# Patient Record
Sex: FEMALE | Race: WHITE | Employment: FULL TIME | URBAN - METROPOLITAN AREA
[De-identification: names, ages, dates, MRNs, and addresses within clinical notes are randomized per-mention and may not be internally consistent; named-entity substitution may affect disease eponyms.]

---

## 2018-10-15 ENCOUNTER — HOSPITAL ENCOUNTER (OUTPATIENT)
Facility: CLINIC | Age: 33
Discharge: HOME | End: 2018-10-15
Attending: FAMILY MEDICINE | Admitting: FAMILY MEDICINE
Payer: COMMERCIAL

## 2018-10-15 VITALS
SYSTOLIC BLOOD PRESSURE: 120 MMHG | RESPIRATION RATE: 16 BRPM | HEART RATE: 90 BPM | DIASTOLIC BLOOD PRESSURE: 73 MMHG | TEMPERATURE: 98 F

## 2018-10-15 PROBLEM — Z34.80 PRENATAL CARE, SUBSEQUENT PREGNANCY: Status: ACTIVE | Noted: 2018-10-15

## 2018-10-15 LAB
ALBUMIN UR-MCNC: NEGATIVE MG/DL
AMORPH CRY #/AREA URNS HPF: ABNORMAL /HPF
APPEARANCE UR: ABNORMAL
BILIRUB UR QL STRIP: NEGATIVE
COLOR UR AUTO: YELLOW
GLUCOSE UR STRIP-MCNC: NEGATIVE MG/DL
HGB UR QL STRIP: NEGATIVE
KETONES UR STRIP-MCNC: NEGATIVE MG/DL
LEUKOCYTE ESTERASE UR QL STRIP: NEGATIVE
NITRATE UR QL: NEGATIVE
PH UR STRIP: 6 PH (ref 5–7)
RBC #/AREA URNS AUTO: <1 /HPF (ref 0–2)
RUPTURE OF FETAL MEMBRANES BY ROM PLUS: NEGATIVE
SOURCE: ABNORMAL
SP GR UR STRIP: 1.01 (ref 1–1.03)
UROBILINOGEN UR STRIP-MCNC: 0 MG/DL (ref 0–2)
WBC #/AREA URNS AUTO: 2 /HPF (ref 0–5)

## 2018-10-15 PROCEDURE — G0463 HOSPITAL OUTPT CLINIC VISIT: HCPCS | Mod: 25

## 2018-10-15 PROCEDURE — 59025 FETAL NON-STRESS TEST: CPT

## 2018-10-15 PROCEDURE — 84112 EVAL AMNIOTIC FLUID PROTEIN: CPT | Performed by: OBSTETRICS & GYNECOLOGY

## 2018-10-15 PROCEDURE — 81001 URINALYSIS AUTO W/SCOPE: CPT | Performed by: OBSTETRICS & GYNECOLOGY

## 2018-10-15 RX ORDER — ONDANSETRON 2 MG/ML
4 INJECTION INTRAMUSCULAR; INTRAVENOUS EVERY 6 HOURS PRN
Status: DISCONTINUED | OUTPATIENT
Start: 2018-10-15 | End: 2018-10-15 | Stop reason: HOSPADM

## 2018-10-15 NOTE — IP AVS SNAPSHOT
[facility]    [address] [address]    Phone:  [phone]    Fax:  [phone]                                       After Visit Summary   10/15/2018    Katharine Barger    MRN: [id number]           After Visit Summary Signature Page     I have received my discharge instructions, and my questions have been answered. I have discussed any challenges I see with this plan with the nurse or doctor.    ..........................................................................................................................................  Patient/Patient Representative Signature      ..........................................................................................................................................  Patient Representative Print Name and Relationship to Patient    ..................................................               ................................................  Date                                   Time    ..........................................................................................................................................  Reviewed by Signature/Title    ...................................................              ..............................................  Date                                               Time          22EPIC Rev 08/18

## 2018-10-15 NOTE — PROGRESS NOTES
S: Discharge from triage  A: Vital Signs  Temp: 98  F (36.7  C)  Temp src: Oral  Resp: 16  Pulse: 90  BP: 120/73     FHR  Monitor: External US  Variability: Moderate  Baseline Rate (Fetus A): 140 bpm  Baseline Classification: Normal  Accelerations: Present  Decelerations: None  NST Start Time: 1410  NST Stop Time: 1430  Non-stress Test Interpretation: Reactive  FHTs are category 1. Strip reviewed by Sylvia Greene RN.  Uterine Activity  Monitor: [place]  Contraction Frequency (minutes): irregular  Contraction Duration (seconds): 40-60  Contraction Quality: Not feeling contraction  Resting Tone Palpated: Soft     Admission on 10/15/2018   Component Date Value     Color Urine 10/15/2018 Yellow      Appearance Urine 10/15/2018 Slightly Cloudy      Glucose Urine 10/15/2018 Negative      Bilirubin Urine 10/15/2018 Negative      Ketones Urine 10/15/2018 Negative      Specific Gravity Urine 10/15/2018 1.012      Blood Urine 10/15/2018 Negative      pH Urine 10/15/2018 6.0      Protein Albumin Urine 10/15/2018 Negative      Urobilinogen mg/dL 10/15/2018 0.0      Nitrite Urine 10/15/2018 Negative      Leukocyte Esterase Urine 10/15/2018 Negative      Source 10/15/2018 Midstream Urine      WBC Urine 10/15/2018 2      RBC Urine 10/15/2018 <1      Amorphous Crystals 10/15/2018 Few*     Rupture of Fetal Membran* 10/15/2018 Negative      Dr. ALEX Barry informed of above and discharge order received.   R: Plan includes: Labor instuctions and warning signs given Fetal kick count instructions given. Follow up clinic appointment: keep scheduled appt.  Patient instructed to report any recurrence of above concerns to her primary care provider during clinic hours or [facility] at any other time. Patient verbalized understanding of education and agreement with plan. Agrees to call for any problems, questions or concerns.  Discharged undelivered via ambulatory  in stable condition with all belongings. Accompanied by  .

## 2018-10-15 NOTE — IP AVS SNAPSHOT
MRN:[id number]                      After Visit Summary   10/15/2018    Katharine Barger    MRN: [id number]           Thank you!     Thank you for choosing [facility] for your care. Our goal is always to provide you with excellent care. Hearing back from our patients is one way we can continue to improve our services. Please take a few minutes to complete the written survey that you may receive in the mail after you visit with us. Thank you!        Patient Information     Date Of Birth          1985        Designated Caregiver       Most Recent Value    Caregiver    Will someone help with your care after discharge? yes    Name of designated caregiver Bassam    Phone number of caregiver [phone]      About your hospital stay     You were admitted on:  October 15, 2018 You last received care in the:  [facility]    You were discharged on:  October 15, 2018       Who to Call     For medical emergencies, please call 911.  For non-urgent questions about your medical care, please call your primary care provider or clinic, [phone]          Attending Provider     Provider Specialty    Jennifer Barry MD Family Practice       Primary Care Provider Office Phone # Fax #    Jennifer Barry [phone]      Further instructions from your care team       If you have any questions or concerns after returning home, call your regular doctor or [facility] at [phone].    You were seen for: Rule out rupture of membreanes  You had the following care or tests: ROM+, UA, NST      Important things to know after discharge:    Drink 8-10 glasses of juice or water each day.    May take bath or shower.    Rest on your side--left side is best.    Eat lightly--soups, Jell-O, etc.  No spicy foods.    Activity:--walking is good during early labor but rest when you are tired.    CALL YOUR DOCTOR IF:    Your bag of poole (membranes) break or you notice leaking in your  "underwear.     Bright red blood leaking from your vagina..    For second (plus) baby: contractions (tightenings) less than 10 minutes apart and getting stronger.    Other: Call [facility] with questions or concerns    Follow up with your DOCTOR:  As scheduled    I have read these instructions and I understand the above information.         Pending Results     No orders found from 10/13/2018 to 10/16/2018.            Admission Information     Date & Time Provider Department Dept. Phone    10/15/2018 Jennifer Barry MD [facility] [phone]      Your Vitals Were     Blood Pressure Pulse Temperature Respirations          120/73 90 98  F (36.7  C) (Oral) 16        MyChart Information     [employer] lets you send messages to your doctor, view your test results, renew your prescriptions, schedule appointments and more. To sign up, go to [email or web address] . Click on \"Log in\" on the left side of the screen, which will take you to the Welcome page. Then click on \"Sign up Now\" on the right side of the page.     You will be asked to enter the access code listed below, as well as some personal information. Please follow the directions to create your username and password.     Your access code is: [email or web address]  Expires: 2019  3:30 PM     Your access code will  in 90 days. If you need help or a new code, please call your [place] clinic or [phone].        Care EveryWhere ID     This is your Care EveryWhere ID. This could be used by other organizations to access your [place] medical records  [id number]        Equal Access to Services     [facility] DOLORES : Hadii zaira randhawao Soderek, waaxda luqadaha, qaybta kaalmada tika wiggins. So [facility] [phone].    ATENCIÓN: Si habla español, tiene a tao disposición servicios gratuitos de asistencia lingüística. Llame al [phone].    We comply with applicable federal civil rights laws and [place] laws. We " do not discriminate on the basis of race, color, national origin, age, disability, sex, sexual orientation, or gender identity.               Review of your medicines      UNREVIEWED medicines. Ask your doctor about these medicines        Dose / Directions    PRENATAL VITAMINS PO        Dose:  1 tablet   Take 1 tablet by mouth daily   Refills:  0                Protect others around you: Learn how to safely use, store and throw away your medicines at www.disposemymeds.org.             Medication List: This is a list of all your medications and when to take them. Check marks below indicate your daily home schedule. Keep this list as a reference.      Medications           Morning Afternoon Evening Bedtime As Needed    PRENATAL VITAMINS PO   Take 1 tablet by mouth daily                                          More Information        Kick Counts    It s normal to worry about your baby s health. One way you can know your baby s doing well is to record the baby s movements once a day. This is called a kick count. Remember to take your kick count records to all your appointments with your healthcare provider.  How to count kicks  Here are tips for counting kicks:    Choose a time when the baby is active, such as after a meal.     Sit comfortably or lie on your side.     The first time the baby moves, write down the time.     Count each movement until the baby has moved 10 times. This can take from 20 minutes to 2 hours.     Try to do it at the same time each day.  When to call your healthcare provider  Call your healthcare provider right away if you notice any of the following:    Your baby moves fewer than 10 times in 2 hours while you re doing kick counts.    Your baby moves much less often than on the days before.    You have not felt your baby move all day.  Date Last Reviewed: 12/1/2017 2000-2017 [employer]. [address] [place] [address], [place] [address]. All rights reserved. This information is not  intended as a substitute for professional medical care. Always follow your healthcare professional's instructions.

## 2018-10-15 NOTE — PROGRESS NOTES
S:Patient presents due to  fluid leaking .  B:38w5d   Allergies: Review of patient's allergies indicates no known allergies.  A:Vital Signs  Temp: 98  F (36.7  C)  Temp src: Oral  Resp: 16  Pulse: 90  BP: 120/73     FHR  Monitor: External US  Variability: Moderate  Baseline Rate (Fetus A): 140 bpm  Baseline Classification: Normal  Accelerations: Present  Decelerations: None  NST Start Time: 1410  NST Stop Time: 1430  Non-stress Test Interpretation: Reactive  FHTs are category 1.  Uterine Activity  Monitor: [place]  Contraction Frequency (minutes): irregular  Contraction Duration (seconds): 40-60  Contraction Quality: Not feeling contraction  Resting Tone Palpated: Soft       Dr. ALEX Barry called and orders received.  Plan includes; Encourage po fluids, Monitor, observe and reevaluate and Labs: ROM+ and UA . Reviewed with patient and she agrees with plan.   Bill of Rights given & questions discussed?: Yes    Prenatal Breastfeeding Education Toolkit provided for patient to review,helping her to make an informed decision on a feeding choice for her baby. Patient declined. Questions answered.    Oriented to room and call light.

## 2018-10-15 NOTE — DISCHARGE INSTRUCTIONS
If you have any questions or concerns after returning home, call your regular doctor or the [facility] at [phone].    You were seen for: Rule out rupture of membreanes  You had the following care or tests: ROM+, UA, NST      Important things to know after discharge:    Drink 8-10 glasses of juice or water each day.    May take bath or shower.    Rest on your side--left side is best.    Eat lightly--soups, Jell-O, etc.  No spicy foods.    Activity:--walking is good during early labor but rest when you are tired.    CALL YOUR DOCTOR IF:    Your bag of poole (membranes) break or you notice leaking in your underwear.     Bright red blood leaking from your vagina..    For second (plus) baby: contractions (tightenings) less than 10 minutes apart and getting stronger.    Other: Call [facility]place with questions or concerns    Follow up with your DOCTOR:  As scheduled    I have read these instructions and I understand the above information.

## 2018-10-17 ENCOUNTER — HEALTH MAINTENANCE LETTER (OUTPATIENT)
Age: 33
End: 2018-10-17

## 2018-10-21 ENCOUNTER — TELEPHONE (OUTPATIENT)
Dept: OBGYN | Facility: CLINIC | Age: 33
End: 2018-10-21

## 2018-10-21 NOTE — PATIENT INSTRUCTIONS
Encouraged taking ES tylenol every 6 hours and to push fluids.  Call back if it is unrelieved and she wants to be checked out.  Pt is ok with this.

## 2018-10-21 NOTE — TELEPHONE ENCOUNTER
"Pt called into the [facility] with reports of headache that she's had all day \"but it won't go away\".  She is due on Wednesday.  Denies history of HTN in this pregnancy.  No swelling of hands, feet or face.  Did see stars a couple times.  Took a nap and has pushed fluids so her pee is almost clear.    "

## 2018-11-01 ENCOUNTER — HOSPITAL ENCOUNTER (INPATIENT)
Facility: CLINIC | Age: 33
LOS: 1 days | Discharge: HOME | End: 2018-11-02
Attending: FAMILY MEDICINE | Admitting: OBSTETRICS & GYNECOLOGY
Payer: COMMERCIAL

## 2018-11-01 LAB — BLOOD BANK CMNT PATIENT-IMP: NORMAL

## 2018-11-01 PROCEDURE — 25000132 ZZH RX MED GY IP 250 OP 250 PS 637: Performed by: FAMILY MEDICINE

## 2018-11-01 PROCEDURE — 12000027 ZZH R&B OB

## 2018-11-01 PROCEDURE — 25000132 ZZH RX MED GY IP 250 OP 250 PS 637: Performed by: OBSTETRICS & GYNECOLOGY

## 2018-11-01 PROCEDURE — 72200001 ZZH LABOR CARE VAGINAL DELIVERY SINGLE

## 2018-11-01 RX ORDER — AMOXICILLIN 250 MG
2 CAPSULE ORAL 2 TIMES DAILY
Status: DISCONTINUED | OUTPATIENT
Start: 2018-11-01 | End: 2018-11-01

## 2018-11-01 RX ORDER — LANOLIN 100 %
OINTMENT (GRAM) TOPICAL
Status: DISCONTINUED | OUTPATIENT
Start: 2018-11-01 | End: 2018-11-01

## 2018-11-01 RX ORDER — HYDROCORTISONE 2.5 %
CREAM (GRAM) TOPICAL 3 TIMES DAILY PRN
Status: DISCONTINUED | OUTPATIENT
Start: 2018-11-01 | End: 2018-11-02 | Stop reason: HOSPADM

## 2018-11-01 RX ORDER — OXYTOCIN/0.9 % SODIUM CHLORIDE 30/500 ML
340 PLASTIC BAG, INJECTION (ML) INTRAVENOUS CONTINUOUS PRN
Status: DISCONTINUED | OUTPATIENT
Start: 2018-11-01 | End: 2018-11-02 | Stop reason: HOSPADM

## 2018-11-01 RX ORDER — NALOXONE HYDROCHLORIDE 0.4 MG/ML
.1-.4 INJECTION, SOLUTION INTRAMUSCULAR; INTRAVENOUS; SUBCUTANEOUS
Status: DISCONTINUED | OUTPATIENT
Start: 2018-11-01 | End: 2018-11-02 | Stop reason: HOSPADM

## 2018-11-01 RX ORDER — BISACODYL 10 MG
10 SUPPOSITORY, RECTAL RECTAL DAILY PRN
Status: DISCONTINUED | OUTPATIENT
Start: 2018-11-03 | End: 2018-11-02 | Stop reason: HOSPADM

## 2018-11-01 RX ORDER — BISACODYL 10 MG
10 SUPPOSITORY, RECTAL RECTAL DAILY PRN
Status: DISCONTINUED | OUTPATIENT
Start: 2018-11-03 | End: 2018-11-01

## 2018-11-01 RX ORDER — LANOLIN 100 %
OINTMENT (GRAM) TOPICAL
Status: DISCONTINUED | OUTPATIENT
Start: 2018-11-01 | End: 2018-11-02 | Stop reason: HOSPADM

## 2018-11-01 RX ORDER — OXYTOCIN/0.9 % SODIUM CHLORIDE 30/500 ML
100 PLASTIC BAG, INJECTION (ML) INTRAVENOUS CONTINUOUS
Status: DISCONTINUED | OUTPATIENT
Start: 2018-11-01 | End: 2018-11-01

## 2018-11-01 RX ORDER — HYDROCORTISONE 2.5 %
CREAM (GRAM) TOPICAL 3 TIMES DAILY PRN
Status: DISCONTINUED | OUTPATIENT
Start: 2018-11-01 | End: 2018-11-01

## 2018-11-01 RX ORDER — OXYTOCIN 10 [USP'U]/ML
10 INJECTION, SOLUTION INTRAMUSCULAR; INTRAVENOUS
Status: DISCONTINUED | OUTPATIENT
Start: 2018-11-01 | End: 2018-11-02 | Stop reason: HOSPADM

## 2018-11-01 RX ORDER — OXYTOCIN/0.9 % SODIUM CHLORIDE 30/500 ML
100 PLASTIC BAG, INJECTION (ML) INTRAVENOUS CONTINUOUS
Status: DISCONTINUED | OUTPATIENT
Start: 2018-11-01 | End: 2018-11-02 | Stop reason: HOSPADM

## 2018-11-01 RX ORDER — AMOXICILLIN 250 MG
1 CAPSULE ORAL 2 TIMES DAILY
Status: DISCONTINUED | OUTPATIENT
Start: 2018-11-01 | End: 2018-11-02 | Stop reason: HOSPADM

## 2018-11-01 RX ORDER — IBUPROFEN 800 MG/1
800 TABLET, FILM COATED ORAL EVERY 6 HOURS PRN
Status: DISCONTINUED | OUTPATIENT
Start: 2018-11-01 | End: 2018-11-02 | Stop reason: HOSPADM

## 2018-11-01 RX ORDER — AMOXICILLIN 250 MG
2 CAPSULE ORAL 2 TIMES DAILY
Status: DISCONTINUED | OUTPATIENT
Start: 2018-11-01 | End: 2018-11-02 | Stop reason: HOSPADM

## 2018-11-01 RX ORDER — IBUPROFEN 800 MG/1
800 TABLET, FILM COATED ORAL EVERY 6 HOURS PRN
Status: DISCONTINUED | OUTPATIENT
Start: 2018-11-01 | End: 2018-11-01

## 2018-11-01 RX ORDER — ACETAMINOPHEN 325 MG/1
650 TABLET ORAL EVERY 4 HOURS PRN
Status: DISCONTINUED | OUTPATIENT
Start: 2018-11-01 | End: 2018-11-02 | Stop reason: HOSPADM

## 2018-11-01 RX ORDER — OXYTOCIN 10 [USP'U]/ML
10 INJECTION, SOLUTION INTRAMUSCULAR; INTRAVENOUS
Status: DISCONTINUED | OUTPATIENT
Start: 2018-11-01 | End: 2018-11-01

## 2018-11-01 RX ORDER — ACETAMINOPHEN 325 MG/1
650 TABLET ORAL EVERY 4 HOURS PRN
Status: DISCONTINUED | OUTPATIENT
Start: 2018-11-01 | End: 2018-11-01

## 2018-11-01 RX ORDER — NALOXONE HYDROCHLORIDE 0.4 MG/ML
.1-.4 INJECTION, SOLUTION INTRAMUSCULAR; INTRAVENOUS; SUBCUTANEOUS
Status: DISCONTINUED | OUTPATIENT
Start: 2018-11-01 | End: 2018-11-01

## 2018-11-01 RX ORDER — AMOXICILLIN 250 MG
1 CAPSULE ORAL 2 TIMES DAILY
Status: DISCONTINUED | OUTPATIENT
Start: 2018-11-01 | End: 2018-11-01

## 2018-11-01 RX ORDER — OXYTOCIN/0.9 % SODIUM CHLORIDE 30/500 ML
340 PLASTIC BAG, INJECTION (ML) INTRAVENOUS CONTINUOUS PRN
Status: DISCONTINUED | OUTPATIENT
Start: 2018-11-01 | End: 2018-11-01

## 2018-11-01 RX ORDER — MISOPROSTOL 200 UG/1
400 TABLET ORAL
Status: DISCONTINUED | OUTPATIENT
Start: 2018-11-01 | End: 2018-11-02 | Stop reason: HOSPADM

## 2018-11-01 RX ADMIN — IBUPROFEN 800 MG: 800 TABLET ORAL at 14:55

## 2018-11-01 RX ADMIN — IBUPROFEN 800 MG: 800 TABLET ORAL at 21:06

## 2018-11-01 RX ADMIN — ACETAMINOPHEN 650 MG: 325 TABLET, FILM COATED ORAL at 16:20

## 2018-11-01 NOTE — IP AVS SNAPSHOT
MRN:[id number]                      After Visit Summary   11/1/2018    Katharine Barger    MRN: [id number]           Thank you!     Thank you for choosing [facility] for your care. Our goal is always to provide you with excellent care. Hearing back from our patients is one way we can continue to improve our services. Please take a few minutes to complete the written survey that you may receive in the mail after you visit with us. Thank you!        Patient Information     Date Of Birth          1985        Designated Caregiver       Most Recent Value    Caregiver    Will someone help with your care after discharge? yes    Name of designated caregiver Bassam    Phone number of caregiver [phone]      About your hospital stay     You were admitted on:  November 1, 2018 You last received care in the:  [facility]    You were discharged on:  November 2, 2018        Reason for your hospital stay       Maternity care                  Who to Call     For medical emergencies, please call 911.  For non-urgent questions about your medical care, please call your primary care provider or clinic, [phone]          Attending Provider     Provider Specialty    [facility]       Primary Care Provider Office Phone # Fax #    Jennifer Barry [phone]      After Care Instructions     Activity       Review discharge instructions            Diet       Resume previous diet            Discharge Instructions - Postpartum visit       Schedule postpartum visit with your provider and return to clinic in 6 weeks.    Your activity upon discharge: Activity as tolerated  No lifting restrictions  No driving for 2 weeks or no driving while on narcotic pain medications  Nothing in the vagina including sex, douching or tampons for 6 weeks    Call if you have any of the following:  Temperature > 100.4  Foul smelling vaginal  discharge  Bleeding > 1 pad per hour x 2 hrs,   Pain not controlled by oral pain meds  Severe constipation or severe nausea or vomiting.                  Further instructions from your care team       Postpartum Vaginal Delivery Instructions     MAKE AN APPOINTMENT TO FOLLOW UP WITH YOUR DOCTOR IN 6 WEEKS.    Activity       Ask family and friends for help when you need it.    Do not place anything in your vagina for 6 weeks.    You are not restricted on other activities, but take it easy for a few weeks to allow your body to recover from delivery.  You are able to do any activities you feel up to that point.    No driving until you have stopped taking your pain medications (usually two weeks after delivery).     Call your health care provider if you have any of these symptoms:       Increased pain, swelling, redness, or fluid around your stiches from an episiotomy or perineal tear.    A fever above 100.4 F (38 C) with or without chills when placing a thermometer under your tongue.    You soak a sanitary pad with blood within 1 hour, or you see blood clots larger than a golf ball.    Bleeding that lasts more than 6 weeks.    Vaginal discharge that smells bad.    Severe pain, cramping or tenderness in your lower belly area.    A need to urinate more frequently (use the toilet more often), more urgently (use the toilet very quickly), or it burns when you urinate.    Nausea and vomiting.    Redness, swelling or pain around a vein in your leg.    Problems breastfeeding or a red or painful area on your breast.    Chest pain and cough or are gasping for air.    Problems coping with sadness, anxiety, or depression.  If you have any concerns about hurting yourself or the baby, call your provider immediately.     You have questions or concerns after you return home.     Keep your hands clean:  Always wash your hands before touching your perineal area and stitches.  This helps reduce your risk of infection.  If your hands  "aren't dirty, you may use an alcohol hand-rub to clean your hands. Keep your nails clean and short.        Pending Results     Date and Time Order Name Status Description    2018 0601 Rh Immune Globulin Study In process             Statement of Approval     Ordered          18 0737  I have reviewed and agree with all the recommendations and orders detailed in this document.  EFFECTIVE NOW     Approved and electronically signed by:  Delia Rehman MD             Admission Information     Date & Time Provider Department Dept. Phone    2018 Kerry Pabon MD [facility] [phone]      Your Vitals Were     Blood Pressure Pulse Temperature Respirations          110/65 80 97.9  F (36.6  C) (Oral) 18        MyChart Information     [employer] lets you send messages to your doctor, view your test results, renew your prescriptions, schedule appointments and more. To sign up, go to [email or web address] . Click on \"Log in\" on the left side of the screen, which will take you to the Welcome page. Then click on \"Sign up Now\" on the right side of the page.     You will be asked to enter the access code listed below, as well as some personal information. Please follow the directions to create your username and password.     Your access code is: [email or web address]  Expires: 2019  3:30 PM     Your access code will  in 90 days. If you need help or a new code, please call your [place] clinic or [phone].        Care EveryWhere ID     This is your Care EveryWhere ID. This could be used by other organizations to access your [place] medical records  [id number]        Equal Access to Services     [facility] : Hadii zaira Landin, waaxda luqadaha, qaybta kaalmada rosalie, tika hendricks. So [facility] [phone].    ATENCIÓN: Si habla español, tiene a tao disposición servicios gratuitos de asistencia lingüística. Llame al [phone].    We " comply with applicable federal civil rights laws and [place] laws. We do not discriminate on the basis of race, color, national origin, age, disability, sex, sexual orientation, or gender identity.               Review of your medicines      START taking        Dose / Directions    acetaminophen 500 MG tablet   Commonly known as:  TYLENOL        Dose:  1000 mg   Take 2 tablets (1,000 mg) by mouth every 6 hours as needed for mild pain   Quantity:  100 tablet   Refills:  0       ibuprofen 600 MG tablet   Commonly known as:  ADVIL/MOTRIN        Dose:  600 mg   Take 1 tablet (600 mg) by mouth every 6 hours as needed for moderate pain   Quantity:  90 tablet   Refills:  1       senna-docusate 8.6-50 MG per tablet   Commonly known as:  SENOKOT-S;PERICOLACE        Dose:  1 tablet   Take 1 tablet by mouth 2 times daily   Quantity:  60 tablet   Refills:  1         CONTINUE these medicines which have NOT CHANGED        Dose / Directions    PRENATAL VITAMINS PO        Dose:  1 tablet   Take 1 tablet by mouth daily   Refills:  0            Where to get your medicines      These medications were sent to [facility] [address]  [address] [address]     Phone:  [phone]     acetaminophen 500 MG tablet    ibuprofen 600 MG tablet    senna-docusate 8.6-50 MG per tablet                Protect others around you: Learn how to safely use, store and throw away your medicines at www.disposemymeds.org.             Medication List: This is a list of all your medications and when to take them. Check marks below indicate your daily home schedule. Keep this list as a reference.      Medications           Morning Afternoon Evening Bedtime As Needed    acetaminophen 500 MG tablet   Commonly known as:  TYLENOL   Take 2 tablets (1,000 mg) by mouth every 6 hours as needed for mild pain   Last time this was given:  650 mg on 11/2/2018  5:58 AM                                ibuprofen 600 MG  tablet   Commonly known as:  ADVIL/MOTRIN   Take 1 tablet (600 mg) by mouth every 6 hours as needed for moderate pain   Last time this was given:  800 mg on 11/2/2018  9:49 AM                                PRENATAL VITAMINS PO   Take 1 tablet by mouth daily                                senna-docusate 8.6-50 MG per tablet   Commonly known as:  SENOKOT-S;PERICOLACE   Take 1 tablet by mouth 2 times daily   Last time this was given:  1 tablet on 11/2/2018  8:11 AM

## 2018-11-01 NOTE — H&P
[facility] Labor and Delivery History and Physical    Katharine Barger MRN# [id number]   Age: 33 year old YOB: 1985     Date of Admission:  2018    Primary care provider: Jennifer Baryr           Chief Complaint:   Katharine Barger is a 33 year old female who is 41w1d pregnant and being admitted for active labor management.          Pregnancy history:     OBSTETRIC HISTORY:    Obstetric History       T2      L2     SAB0   TAB0   Ectopic0   Multiple0   Live Births0       # Outcome Date GA Lbr Garrett/2nd Weight Sex Delivery Anes PTL Lv   3 Current            2 Term            1 Term                   EDC: Estimated Date of Delivery: 10/24/18    Prenatal Labs: No results found for: ABO, RH, AS, HEPBANG, CHPCRT, GCPCRT, TREPAB, RUBELLAABIGG, HGB, HIV    GBS Status:   No results found for: GBS    Active Problem List  Patient Active Problem List   Diagnosis     Prenatal care, subsequent pregnancy       Medication Prior to Admission  Prescriptions Prior to Admission   Medication Sig Dispense Refill Last Dose     Prenatal Multivit-Min-Fe-FA (PRENATAL VITAMINS PO) Take 1 tablet by mouth daily   10/14/2018 at Unknown time   .        Maternal Past Medical History:   No past medical history on file.                     Social History:   This patient has no significant social history         Review of Systems:   The Review of Systems is negative other than noted in the HPI          Physical Exam:   Vitals were reviewed                     Constitutional:   awake, alert, cooperative, no apparent distress, and appears stated age      Cervix:   Membranes: SROM  clear amniotic fluid   Dilation: 10   Effacement: 100%   Station:+2   Consistency: soft   Position: Mid  Presentation:Cephalic  Fetal Heart Rate Tracing: reactive and reassuring, Tier 1 (normal)  Tocometer: external monitor                       Assessment:   Katharine Barger is a 41w1d pregnant female admitted with active  labor management.          Plan:   Admit - see IP orders  Anticipate     Kerry Pabon MD

## 2018-11-01 NOTE — L&D DELIVERY NOTE
33 year old  presented @ 41w1d to [facility] for active labor management.     Stage 1: presented @ 9 cm with bulging BOW that spontaneously ruptured.  Rapid progression to complete dilation.  Dr. Martin was en route  Stage 2: Pushed over 1 contraction in the hands knees position     of viable female, 7#5, Apgars 9/9  Placenta with 3vc  Lacerations: none  Mother and infant stable.    Kerry Pabon M.D.    Delivery Summary    Katharine Barger MRN# [id number]   Age: 33 year old YOB: 1985           Labor Event Times    Labor onset date:  18    Dilation complete date:  18 Complete time:   2:14 PM   Start pushing date/time:  2018 1414            Labor Events    Labor Type:  Spontaneous      Antibiotics received during labor?:  No      Rupture date/time:     Rupture type:  Spontaneous rupture of membranes occuring during spontaneous labor or augmentation   Fluid color:  Clear   Fluid odor:  Normal      Delivery/Placenta Date and Time    Delivery Date:  18 Delivery Time:   2:14 PM   Placenta Date/Time:  2018  2:23 PM      Vaginal Counts    Initial count performed by 2 team members:   Two Team Members   anna black Bonnie          Needles Suture [place] Sponges Instruments   Initial counts 2  5    Added to count       Final counts 2  5       Placed during labor Accounted for at the end of labor      Final count performed by 2 team members:   Two Team Members   Anna Black Cindy         Final count correct?:  Yes         Apgars    Living status:  Living    1 Minute 5 Minute 10 Minute 15 Minute 20 Minute   Skin color: 1  1       Heart rate: 2  2       Reflex irritability: 2  2       Muscle tone: 2  2       Respiratory effort: 2  2       Total: 9  9          Apgars assigned by:  ESPERANZA TUCKER      Cord    Vessels:  3 Vessels Complications:  Nuchal   Cord Blood Disposition:  Lab Gases Sent?:  No          Resuscitation    Methods:  None          [place] Measurements    Weight:  7 lb 5 oz       Skin to Skin and Feeding Plan    Skin to skin initiation date/time: 18 1414   Skin to skin with:  Mother   Skin to skin end date/time: 18 1435      Breastfeeding initiated date/time:  2018 1430   How do you plan to feed your baby:  Breastfeeding      Labor Events and Shoulder Dystocia    Fetal Tracing Prior to Delivery:  Category 1   Shoulder dystocia present?:  Neg            Delivery (Maternal) (Provider to Complete) (186873)    Episiotomy:  None   Perineal lacerations:  None    Vaginal laceration?:  No    Cervical laceration?:  No          Mother's Information  Mother: Katharine Barger #[id number]    Start of Mother's Information     IO Blood Loss  18 0000 - 18 1636    Mom's I/O Activity            End of Mother's Information  Mother: Katharine Barger #[id number]            Delivery - Provider to Complete (164422)    Delivering clinician:  KERRY PABON   Attempted Delivery Types (Choose all that apply):  Spontaneous Vaginal Delivery   Delivery Type (Choose the 1 that will go to the Birth History):  Vaginal, Spontaneous Delivery                     Other personnel:   Provider Role   ESPERANZA SAUCEDO, IVANA POST             Placenta    Delayed Cord Clamping:  Done   Date/Time:  2018  2:23 PM   Removal:  Spontaneous   Disposition:  Hospital disposal      Anesthesia    Method:  None         Presentation and Position    Presentation:  [place]                    Kerry Pabon MD

## 2018-11-01 NOTE — PLAN OF CARE
Problem: Postpartum (Vaginal Delivery) (Adult,Obstetrics,Pediatric)  Goal: Signs and Symptoms of Listed Potential Problems Will be Absent, Minimized or Managed (Postpartum)  Signs and symptoms of listed potential problems will be absent, minimized or managed by discharge/transition of care (reference Postpartum (Vaginal Delivery) (Adult,Obstetrics,Pediatric) CPG).  Outcome: No Change  Received report at 1500, pt seen and assessed. Pt breastfeeding infant, noted positive bonding,  at bedside supportive, VSS. Pt given paperwork and new beginnings book.

## 2018-11-01 NOTE — PLAN OF CARE
Problem: Postpartum (Vaginal Delivery) (Adult,Obstetrics,Pediatric)  Goal: Signs and Symptoms of Listed Potential Problems Will be Absent, Minimized or Managed (Postpartum)  Signs and symptoms of listed potential problems will be absent, minimized or managed by discharge/transition of care (reference Postpartum (Vaginal Delivery) (Adult,Obstetrics,Pediatric) CPG).   Outcome: No Change  Mother and baby transferred to postpartum unit at 1725 via independently ambulating and infant in bassinet after completion of immediate recovery period. Patient oriented to room and report given to Anu ORDONEZ RN who assumes patient care. Mother and baby bonding well, oriented to PP room,  and in stable condition upon transfer.

## 2018-11-01 NOTE — IP AVS SNAPSHOT
[facility]    [address] [address]    Phone:  [phone]    Fax:  [phone]                                       After Visit Summary   11/1/2018    Katharine Barger    MRN: [id number]           After Visit Summary Signature Page     I have received my discharge instructions, and my questions have been answered. I have discussed any challenges I see with this plan with the nurse or doctor.    ..........................................................................................................................................  Patient/Patient Representative Signature      ..........................................................................................................................................  Patient Representative Print Name and Relationship to Patient    ..................................................               ................................................  Date                                   Time    ..........................................................................................................................................  Reviewed by Signature/Title    ...................................................              ..............................................  Date                                               Time          22EPIC Rev 08/18

## 2018-11-02 VITALS
TEMPERATURE: 98.1 F | SYSTOLIC BLOOD PRESSURE: 102 MMHG | DIASTOLIC BLOOD PRESSURE: 58 MMHG | HEART RATE: 78 BPM | RESPIRATION RATE: 16 BRPM

## 2018-11-02 PROCEDURE — 25000132 ZZH RX MED GY IP 250 OP 250 PS 637: Performed by: OBSTETRICS & GYNECOLOGY

## 2018-11-02 PROCEDURE — 86901 BLOOD TYPING SEROLOGIC RH(D): CPT | Performed by: OBSTETRICS & GYNECOLOGY

## 2018-11-02 PROCEDURE — 25000128 H RX IP 250 OP 636: Performed by: OBSTETRICS & GYNECOLOGY

## 2018-11-02 PROCEDURE — 36415 COLL VENOUS BLD VENIPUNCTURE: CPT | Performed by: OBSTETRICS & GYNECOLOGY

## 2018-11-02 PROCEDURE — 86900 BLOOD TYPING SEROLOGIC ABO: CPT | Performed by: OBSTETRICS & GYNECOLOGY

## 2018-11-02 PROCEDURE — 85461 HEMOGLOBIN FETAL: CPT | Performed by: OBSTETRICS & GYNECOLOGY

## 2018-11-02 RX ORDER — IBUPROFEN 600 MG/1
600 TABLET, FILM COATED ORAL EVERY 6 HOURS PRN
Qty: 90 TABLET | Refills: 1 | Status: SHIPPED | OUTPATIENT
Start: 2018-11-02

## 2018-11-02 RX ORDER — AMOXICILLIN 250 MG
1 CAPSULE ORAL 2 TIMES DAILY
Qty: 60 TABLET | Refills: 1 | Status: SHIPPED | OUTPATIENT
Start: 2018-11-02

## 2018-11-02 RX ORDER — ACETAMINOPHEN 500 MG
1000 TABLET ORAL EVERY 6 HOURS PRN
Qty: 100 TABLET | Refills: 0 | Status: SHIPPED | OUTPATIENT
Start: 2018-11-02

## 2018-11-02 RX ADMIN — SENNOSIDES AND DOCUSATE SODIUM 1 TABLET: 8.6; 5 TABLET ORAL at 08:11

## 2018-11-02 RX ADMIN — IBUPROFEN 800 MG: 800 TABLET ORAL at 09:49

## 2018-11-02 RX ADMIN — ACETAMINOPHEN 650 MG: 325 TABLET, FILM COATED ORAL at 00:53

## 2018-11-02 RX ADMIN — ACETAMINOPHEN 650 MG: 325 TABLET, FILM COATED ORAL at 05:58

## 2018-11-02 RX ADMIN — ACETAMINOPHEN 650 MG: 325 TABLET, FILM COATED ORAL at 17:05

## 2018-11-02 RX ADMIN — HUMAN RHO(D) IMMUNE GLOBULIN 300 MCG: 300 INJECTION, SOLUTION INTRAMUSCULAR at 15:05

## 2018-11-02 RX ADMIN — IBUPROFEN 800 MG: 800 TABLET ORAL at 03:11

## 2018-11-02 ASSESSMENT — ACTIVITIES OF DAILY LIVING (ADL)
RETIRED_EATING: 0-->INDEPENDENT
TRANSFERRING: 0-->INDEPENDENT
TOILETING: 0-->INDEPENDENT
RETIRED_COMMUNICATION: 0-->UNDERSTANDS/COMMUNICATES WITHOUT DIFFICULTY
FALL_HISTORY_WITHIN_LAST_SIX_MONTHS: NO
AMBULATION: 0-->INDEPENDENT
COGNITION: 0 - NO COGNITION ISSUES REPORTED
BATHING: 0-->INDEPENDENT
SWALLOWING: 0-->SWALLOWS FOODS/LIQUIDS WITHOUT DIFFICULTY
DRESS: 0-->INDEPENDENT

## 2018-11-02 NOTE — PLAN OF CARE
Problem: Postpartum (Vaginal Delivery) (Adult,Obstetrics,Pediatric)  Goal: Signs and Symptoms of Listed Potential Problems Will be Absent, Minimized or Managed (Postpartum)  Signs and symptoms of listed potential problems will be absent, minimized or managed by discharge/transition of care (reference Postpartum (Vaginal Delivery) (Adult,Obstetrics,Pediatric) CPG).   Outcome: Improving  Data: Vital signs within normal limits. Postpartum checks within normal limits - see flow record. Patient eating and drinking normally. Patient able to empty bladder independently. Patient ambulating independently. No apparent signs of infection. Perineum healing well. Patient is performing self cares and is able to care for infant. Breastfeeding independently. Positive attachment behaviors are observed with infant. Support persons are present.  Action:  Pain plan was discussed. Patient would like pain meds to be brought in every three hours. Patient was medicated during the shift for cramping. See MAR. Patient education done about breastfeeding,  cares, postpartum cares, pain management/plan and rest. See flow record.  Response:   Patient reassessed within 1 hour after each medication for pain. Patient stated that pain had improved. Patient stated that she was comfortable.   Plan: Anticipate discharge on 11/3/18.

## 2018-11-02 NOTE — DISCHARGE INSTRUCTIONS
Postpartum Vaginal Delivery Instructions     MAKE AN APPOINTMENT TO FOLLOW UP WITH YOUR DOCTOR IN 6 WEEKS.    Activity       Ask family and friends for help when you need it.    Do not place anything in your vagina for 6 weeks.    You are not restricted on other activities, but take it easy for a few weeks to allow your body to recover from delivery.  You are able to do any activities you feel up to that point.    No driving until you have stopped taking your pain medications (usually two weeks after delivery).     Call your health care provider if you have any of these symptoms:       Increased pain, swelling, redness, or fluid around your stiches from an episiotomy or perineal tear.    A fever above 100.4 F (38 C) with or without chills when placing a thermometer under your tongue.    You soak a sanitary pad with blood within 1 hour, or you see blood clots larger than a golf ball.    Bleeding that lasts more than 6 weeks.    Vaginal discharge that smells bad.    Severe pain, cramping or tenderness in your lower belly area.    A need to urinate more frequently (use the toilet more often), more urgently (use the toilet very quickly), or it burns when you urinate.    Nausea and vomiting.    Redness, swelling or pain around a vein in your leg.    Problems breastfeeding or a red or painful area on your breast.    Chest pain and cough or are gasping for air.    Problems coping with sadness, anxiety, or depression.  If you have any concerns about hurting yourself or the baby, call your provider immediately.     You have questions or concerns after you return home.     Keep your hands clean:  Always wash your hands before touching your perineal area and stitches.  This helps reduce your risk of infection.  If your hands aren't dirty, you may use an alcohol hand-rub to clean your hands. Keep your nails clean and short.

## 2018-11-02 NOTE — PLAN OF CARE
Problem: Postpartum (Vaginal Delivery) (Adult,Obstetrics,Pediatric)  Goal: Signs and Symptoms of Listed Potential Problems Will be Absent, Minimized or Managed (Postpartum)  Signs and symptoms of listed potential problems will be absent, minimized or managed by discharge/transition of care (reference Postpartum (Vaginal Delivery) (Adult,Obstetrics,Pediatric) CPG).   Outcome: Improving  Patient up independently in her room. Voiding without difficulty. Had tub bath. Breastfeeding independently. Ibuprofen administered at 2100. Minimal discharge, firm fundus. /54  Pulse 75  Temp 97.8  F (36.6  C) (Oral)  Resp 16

## 2018-11-02 NOTE — DISCHARGE SUMMARY
[facility] Discharge Summary    Katharine Barger MRN# [id number]   Age: 33 year old YOB: 1985     Date of Admission:  2018  Date of Discharge::  2018  Admitting Physician:  Kerry Pabon MD  Discharge Physician:  Delia Rehman MD     Home clinic: [facility]          Admission Diagnoses:   Intrauterine pregnancy at 41w1d  GBS negative status  Active labor          Discharge Diagnosis:   Viable female infant, delivered  S/p           Procedures:   Procedure(s): Spontaneous vaginal delivery       No other procedures performed during this admission           Medications Prior to Admission:     Prescriptions Prior to Admission   Medication Sig Dispense Refill Last Dose     Prenatal Multivit-Min-Fe-FA (PRENATAL VITAMINS PO) Take 1 tablet by mouth daily   10/14/2018 at Unknown time             Discharge Medications:     Current Discharge Medication List      START taking these medications    Details   acetaminophen (TYLENOL) 500 MG tablet Take 2 tablets (1,000 mg) by mouth every 6 hours as needed for mild pain  Qty: 100 tablet, Refills: 0    Associated Diagnoses:  (normal spontaneous vaginal delivery)      ibuprofen (ADVIL/MOTRIN) 600 MG tablet Take 1 tablet (600 mg) by mouth every 6 hours as needed for moderate pain  Qty: 90 tablet, Refills: 1    Associated Diagnoses:  (normal spontaneous vaginal delivery)      senna-docusate (SENOKOT-S;PERICOLACE) 8.6-50 MG per tablet Take 1 tablet by mouth 2 times daily  Qty: 60 tablet, Refills: 1    Associated Diagnoses:  (normal spontaneous vaginal delivery)         CONTINUE these medications which have NOT CHANGED    Details   Prenatal Multivit-Min-Fe-FA (PRENATAL VITAMINS PO) Take 1 tablet by mouth daily                   Consultations:   No consultations were requested during this admission          Brief History of Labor:     Katharine Barger is a 33 year old female who is 41w1d pregnant and being  admitted for active labor management.           Hospital Course:     33 year old  presented @ 41w1d to [facility] for active labor management.      Stage 1: presented @ 9 cm with bulging BOW that spontaneously ruptured.  Rapid progression to complete dilation.  Dr. Martin was en route  Stage 2: Pushed over 1 contraction in the hands knees position      of viable female, 7#5, Apgars 9/9  Placenta with 3vc  Lacerations: none  Mother and infant stable.    The patient's hospital course was unremarkable.  On discharge, her pain was well controlled. Vaginal bleeding is similar to peak menstrual flow.  Voiding without difficulty.  Ambulating well and tolerating a normal diet.  No fever.  Breastfeeding well.  Infant is stable.  No bowel movement yet.*  She was discharged on post-partum day #1.    Post-partum hemoglobin: No results found for: HGB          Discharge Instructions and Follow-Up:   Discharge diet: Regular   Discharge activity: Activity as tolerated   Discharge follow-up: Follow up with primary care provider in 6 weeks   Wound care: Drink plenty of fluids           Discharge Disposition:   Discharged to home      Attestation:  I have reviewed today's vital signs, notes, medications, labs and imaging.    Delia Rehman MD

## 2018-11-03 LAB
ABO + RH BLD: NORMAL
ABO + RH BLD: NORMAL
BLOOD BANK CMNT PATIENT-IMP: NORMAL
DATE RH IMM GL GVN: NORMAL
FETAL CELL SCN BLD QL ROSETTE: NORMAL
RH IG VIALS RECOM PATIENT: NORMAL